# Patient Record
Sex: MALE | Race: WHITE | Employment: OTHER | URBAN - METROPOLITAN AREA
[De-identification: names, ages, dates, MRNs, and addresses within clinical notes are randomized per-mention and may not be internally consistent; named-entity substitution may affect disease eponyms.]

---

## 2019-12-14 ENCOUNTER — OFFICE VISIT (OUTPATIENT)
Dept: SLEEP CENTER | Age: 39
End: 2019-12-14
Attending: INTERNAL MEDICINE
Payer: OTHER GOVERNMENT

## 2019-12-14 PROCEDURE — 95810 POLYSOM 6/> YRS 4/> PARAM: CPT

## 2019-12-23 NOTE — PROCEDURES
[address]       Accredited by the [facility] ([facility])    PATIENT'S NAME:        Rose Marie Monroy PHYSICIAN:   Debora Hdez M.D.   REFERRING PHYSICIAN:   Dr. Sohan Rene total sleep time. REM sleep occupied 13.8% of total sleep time with a REM latency of 154.1 minutes. There were a few awakenings during the night. Sleep-disordered breathing was seen during the study. Thirty-six apneas and hypopneas were tallied.   The

## 2019-12-28 ENCOUNTER — OFFICE VISIT (OUTPATIENT)
Dept: SLEEP CENTER | Age: 39
End: 2019-12-28
Attending: INTERNAL MEDICINE
Payer: OTHER GOVERNMENT

## 2019-12-28 DIAGNOSIS — R06.81 APNEA: ICD-10-CM

## 2019-12-28 PROCEDURE — 95811 POLYSOM 6/>YRS CPAP 4/> PARM: CPT

## 2019-12-31 NOTE — PROCEDURES
[address]       Accredited by the [facility] ([facility])    [facility]:        [facility] PH[facility]ICIAN:   Lee Benitez M.D.   REFERRING PHYSICIAN:   Dr. Meme Henriquez seen.  Slow-wave sleep comprised 1.2% of total sleep time. REM sleep occupied 20% of total sleep time with a REM latency of 148.3 minutes. There was a period of significant fragmented sleep.     The patient was started on CPAP at a pressure of 5 which was reduced daytime vigilance. Thank you for your confidence in the [place] [facility]. If you have any questions, please feel free to contact us at [phone].     Dictated By Riley Rosas M.D.  d: 12/31/2019 16:12:51  t: 12/31/2019 16:35:18  [facility] 2654

## 2020-06-06 ENCOUNTER — APPOINTMENT (OUTPATIENT)
Dept: CT IMAGING | Age: 40
End: 2020-06-06
Attending: EMERGENCY MEDICINE
Payer: OTHER GOVERNMENT

## 2020-06-06 ENCOUNTER — HOSPITAL ENCOUNTER (EMERGENCY)
Age: 40
Discharge: HOME | End: 2020-06-06
Attending: EMERGENCY MEDICINE
Payer: OTHER GOVERNMENT

## 2020-06-06 VITALS
OXYGEN SATURATION: 97 % | SYSTOLIC BLOOD PRESSURE: 146 MMHG | DIASTOLIC BLOOD PRESSURE: 79 MMHG | RESPIRATION RATE: 16 BRPM | WEIGHT: 210 LBS | TEMPERATURE: 98 F | HEART RATE: 57 BPM

## 2020-06-06 DIAGNOSIS — N23 RENAL COLIC: Primary | ICD-10-CM

## 2020-06-06 PROCEDURE — 85025 COMPLETE CBC W/AUTO DIFF WBC: CPT | Performed by: EMERGENCY MEDICINE

## 2020-06-06 PROCEDURE — 74176 CT ABD & PELVIS W/O CONTRAST: CPT | Performed by: EMERGENCY MEDICINE

## 2020-06-06 PROCEDURE — 99284 EMERGENCY DEPT VISIT MOD MDM: CPT

## 2020-06-06 PROCEDURE — 81001 URINALYSIS AUTO W/SCOPE: CPT | Performed by: EMERGENCY MEDICINE

## 2020-06-06 PROCEDURE — 80048 BASIC METABOLIC PNL TOTAL CA: CPT | Performed by: EMERGENCY MEDICINE

## 2020-06-06 PROCEDURE — 96374 THER/PROPH/DIAG INJ IV PUSH: CPT

## 2020-06-06 PROCEDURE — 96361 HYDRATE IV INFUSION ADD-ON: CPT

## 2020-06-06 RX ORDER — HYDROCODONE BITARTRATE AND ACETAMINOPHEN 5; 325 MG/1; MG/1
1 TABLET ORAL EVERY 6 HOURS PRN
Qty: 15 TABLET | Refills: 0 | Status: SHIPPED | OUTPATIENT
Start: 2020-06-06 | End: 2020-06-21

## 2020-06-06 RX ORDER — SODIUM CHLORIDE 9 MG/ML
125 INJECTION, SOLUTION INTRAVENOUS CONTINUOUS
Status: DISCONTINUED | OUTPATIENT
Start: 2020-06-06 | End: 2020-06-07

## 2020-06-06 RX ORDER — KETOROLAC TROMETHAMINE 30 MG/ML
15 INJECTION, SOLUTION INTRAMUSCULAR; INTRAVENOUS ONCE
Status: COMPLETED | OUTPATIENT
Start: 2020-06-06 | End: 2020-06-06

## 2020-06-06 RX ORDER — ONDANSETRON 4 MG/1
4 TABLET, ORALLY DISINTEGRATING ORAL EVERY 4 HOURS PRN
Qty: 10 TABLET | Refills: 0 | Status: SHIPPED | OUTPATIENT
Start: 2020-06-06

## 2020-06-06 RX ORDER — SERTRALINE HYDROCHLORIDE 100 MG/1
1 TABLET, FILM COATED ORAL DAILY
COMMUNITY
Start: 2019-11-27

## 2020-06-07 NOTE — ED PROVIDER NOTES
Patient Seen in: [address] [facility]      History   Patient presents with:  Abdomen/Flank Pain    Stated Complaint: right flank pain     HPI    This is a 41-year-old male who presents with acute onset of right flank pain that began Labs Reviewed   URINALYSIS WITH CULTURE REFLEX - Abnormal; Notable for the following components:       Result Value    Blood Urine Moderate (*)     All other components within normal limits   BASIC METABOLIC PANEL (8) - Normal   CBC WITH DIFFERENTIAL W stone in the right ureter, with hydronephrosis. Dictated by: Helen Shah MD on 6/06/2020 at 11:23 PM     Finalized by: Helen Shah MD on 6/06/2020 at 11:25 PM                MDM     IV line was established of normal saline.   He was kept n.p.o.  I

## 2021-04-18 ENCOUNTER — HOSPITAL ENCOUNTER (EMERGENCY)
Age: 41
Discharge: LEFT BEFORE DISCHARGE | End: 2021-04-18
Payer: OTHER GOVERNMENT